# Patient Record
Sex: MALE | Race: WHITE | URBAN - METROPOLITAN AREA
[De-identification: names, ages, dates, MRNs, and addresses within clinical notes are randomized per-mention and may not be internally consistent; named-entity substitution may affect disease eponyms.]

---

## 2022-08-16 NOTE — PROGRESS NOTES
Sally Lunsford MD    Bariatric & Advanced Laparoscopic Surgery & Endoscopy   [address] [place], [place] [address]   Phone [phone]   Fax [phone]         Date of visit: 2022       Primary/Requesting provider: Ana Disla MD            Name: Reji Young       MRN: [id number]        : 1975         Age: 48 y.o. Sex: male          PCP: Ana Disla MD       CC:         Chief Complaint       Patient presents with          New Patient             NP - Calculus of gallbladder            HPI:      Reji Young is a 48 y.o.  male who presents for evaluation of GEOVANY pain that started 1 month ago. Pain is 4/10. Pain does not radiates. Pain is associated with nausea and vomiting. + diarrhea   He went to the ER [facility]) and was found to have gallstones. His symptoms are now resolved. No symptoms with spicy of fatty foods. Previous abdominal surgeries - kidney stone surgery   Last colonoscopy - had 1 more than 10 years ago. Blood thinners - No   Immunosuppressants - No      2022 - Patient is here for follow up. He denies any current symptoms. No abdominal pain. No nausea or vomiting. Tolerating fatty foods. PMH:     History reviewed. No pertinent past medical history.        PSH:        Past Surgical History:   Procedure Laterality Date    LITHOTRIPSY      several times          MEDS:        Current Outpatient Medications:     Enteral Nutrition Supplies (COMPAT CONTAINER/PUMP SET) MISC, by Other route, Disp: , Rfl:     insulin NPH (HUMULIN N;NOVOLIN N) 100 UNIT/ML injection vial, Inject into the skin 2 times daily (before meals), Disp: , Rfl:     amLODIPine (NORVASC) 5 MG tablet, Take 5 mg by mouth daily, Disp: , Rfl:     atorvastatin (LIPITOR) 10 MG tablet, Take by mouth daily, Disp: , Rfl:     lisinopril-hydroCHLOROthiazide (PRINZIDE;ZESTORETIC) 20-12.5 MG per tablet, Take 2 tablets by mouth daily, tenderness or deformity. Heart:  Regular rate and rhythm. No JVD. Abdomen:  Soft, non-tender, non-distended. No guarding or rebound. No palpable masses. Extremities:  Extremities normal, atraumatic, no cyanosis or edema. Skin:  Skin color, texture, turgor normal. No rashes. Labs: All recent labs were reviewed. Normal WBC. Imaging: CT images were independently reviewed by me. +gallstones            Diagnosis Orders   1. Calculus of gallbladder without cholecystitis without obstruction        2. Abnormal findings on diagnostic imaging of other abdominal regions, including retroperitoneum                 Assessment/Plan:  Aakash Orellana  is a 48 y.o. male who has signs  and symptoms consistent with gallstones      We discussed the gallbladder disease pathophysiology and patient verbalized understanding. He denies any symptoms over the last 6 months. He denies any pain, nausea or vomiting at this time. We discussed watchful waiting he prefers to monitor symptoms for now and return if symptoms return. RTC PRN         Time: I spent 30 minutes preparing to see patient (including chart review and preparation), obtaining and/or reviewing additional medical history, performing a physical exam and evaluation, documenting clinical information in the electronic health record,  independently interpreting results, communicating results to patient, family or caregiver, and/or coordinating care.          Kelsi Morrison MD  Bariatric & Minimally Invasive Surgery  [facility]  8/18/2022 9:09 AM

## 2022-08-18 ENCOUNTER — OFFICE VISIT (OUTPATIENT)
Dept: SURGERY | Age: 47
End: 2022-08-18
Payer: COMMERCIAL

## 2022-08-18 VITALS
DIASTOLIC BLOOD PRESSURE: 74 MMHG | HEART RATE: 70 BPM | SYSTOLIC BLOOD PRESSURE: 133 MMHG | WEIGHT: 272 LBS | BODY MASS INDEX: 43.71 KG/M2 | HEIGHT: 66 IN

## 2022-08-18 DIAGNOSIS — R93.5 ABNORMAL FINDINGS ON DIAGNOSTIC IMAGING OF OTHER ABDOMINAL REGIONS, INCLUDING RETROPERITONEUM: ICD-10-CM

## 2022-08-18 DIAGNOSIS — K80.20 CALCULUS OF GALLBLADDER WITHOUT CHOLECYSTITIS WITHOUT OBSTRUCTION: Primary | ICD-10-CM

## 2022-08-18 PROCEDURE — 99214 OFFICE O/P EST MOD 30 MIN: CPT | Performed by: SURGERY

## 2022-08-18 ASSESSMENT — ENCOUNTER SYMPTOMS
EYE ITCHING: 0
CHEST TIGHTNESS: 0
DIARRHEA: 0
BLOOD IN STOOL: 0
COUGH: 0
FACIAL SWELLING: 0
EYE PAIN: 0
CONSTIPATION: 0
ABDOMINAL DISTENTION: 0
ABDOMINAL PAIN: 0
VOMITING: 0
NAUSEA: 0
BACK PAIN: 0
SHORTNESS OF BREATH: 0

## 2024-12-11 ENCOUNTER — OFFICE VISIT (OUTPATIENT)
Dept: ENT CLINIC | Age: 49
End: 2024-12-11
Payer: COMMERCIAL

## 2024-12-11 VITALS — HEIGHT: 66 IN | BODY MASS INDEX: 40.18 KG/M2 | WEIGHT: 250 LBS

## 2024-12-11 DIAGNOSIS — J01.01 ACUTE RECURRENT MAXILLARY SINUSITIS: Primary | ICD-10-CM

## 2024-12-11 PROCEDURE — 99203 OFFICE O/P NEW LOW 30 MIN: CPT | Performed by: PHYSICIAN ASSISTANT

## 2024-12-11 PROCEDURE — 31231 NASAL ENDOSCOPY DX: CPT | Performed by: PHYSICIAN ASSISTANT

## 2024-12-11 RX ORDER — INSULIN GLARGINE-YFGN 100 [IU]/ML
INJECTION, SOLUTION SUBCUTANEOUS
COMMUNITY
Start: 2024-04-10

## 2024-12-11 RX ORDER — SEMAGLUTIDE 0.68 MG/ML
0.25 INJECTION, SOLUTION SUBCUTANEOUS
COMMUNITY
Start: 2024-09-03

## 2024-12-11 RX ORDER — MECOBALAMIN 5000 MCG
15 TABLET,DISINTEGRATING ORAL DAILY
COMMUNITY

## 2024-12-11 RX ORDER — FLUTICASONE PROPIONATE 50 MCG
2 SPRAY, SUSPENSION (ML) NASAL 2 TIMES DAILY
COMMUNITY
Start: 2024-04-16

## 2024-12-11 RX ORDER — GABAPENTIN 100 MG/1
100-200 CAPSULE ORAL PRN
COMMUNITY
Start: 2024-09-06

## 2024-12-11 RX ORDER — LANOLIN ALCOHOL/MO/W.PET/CERES
1000 CREAM (GRAM) TOPICAL DAILY
COMMUNITY

## 2024-12-11 RX ORDER — LISINOPRIL 40 MG/1
40 TABLET ORAL DAILY
COMMUNITY
Start: 2024-07-12

## 2024-12-11 ASSESSMENT — ENCOUNTER SYMPTOMS
ALLERGIC/IMMUNOLOGIC NEGATIVE: 1
SINUS PAIN: 0
EYES NEGATIVE: 1
COUGH: 1
GASTROINTESTINAL NEGATIVE: 1
SINUS PRESSURE: 1

## 2024-12-11 NOTE — PROGRESS NOTES
appearing eustachian tube openings and fossa of Rosenmuller and central nasopharynx.    Complications: The patient had no complications during or immediately following the procedure, The patient tolerated the procedure well.      Assessment/Plan:  1. Acute recurrent maxillary sinusitis  -     NASAL ENDOSCOPY,DX    Pt has acute sinusitis, will treat with Augmentin x 10 days and culture will adjust treatment accordingly. Will see back with any worsening. As this is once a year, will hold off on imaging.         Return if symptoms worsen or fail to improve.    Patient agrees with this plan.        MELQUIADES Brewster    This note was generated using voice recognition software, please excuse any typos.

## 2024-12-15 LAB
BACTERIA SPEC CULT: ABNORMAL
GRAM STN SPEC: ABNORMAL
GRAM STN SPEC: ABNORMAL
SERVICE CMNT-IMP: ABNORMAL

## 2024-12-18 ENCOUNTER — TELEPHONE (OUTPATIENT)
Dept: ENT CLINIC | Age: 49
End: 2024-12-18

## 2024-12-18 NOTE — TELEPHONE ENCOUNTER
Pt has bacterial infection, currently feeling better but still getting clear drainage, has 3 more days of abx go ahead and follow that through and I will call with sensitivities.

## 2024-12-19 LAB
ANTIMICROBIAL SUSCEPTIBILITY: ABNORMAL
BACTERIA ISLT: ABNORMAL
BACTERIA ISLT: ABNORMAL
BACTERIA SPEC CULT: ABNORMAL
BACTERIA SPEC CULT: ABNORMAL
GRAM STN SPEC: ABNORMAL
GRAM STN SPEC: ABNORMAL
SERVICE CMNT-IMP: ABNORMAL
SPECIMEN SOURCE: ABNORMAL